# Patient Record
Sex: MALE | Race: WHITE
[De-identification: names, ages, dates, MRNs, and addresses within clinical notes are randomized per-mention and may not be internally consistent; named-entity substitution may affect disease eponyms.]

---

## 2019-08-13 ENCOUNTER — HOSPITAL ENCOUNTER (EMERGENCY)
Dept: HOSPITAL 84 - D.ER | Age: 69
Discharge: HOME | End: 2019-08-13
Payer: MEDICARE

## 2019-08-13 VITALS
HEIGHT: 64 IN | BODY MASS INDEX: 26.52 KG/M2 | WEIGHT: 155.33 LBS | HEIGHT: 64 IN | WEIGHT: 155.33 LBS | BODY MASS INDEX: 26.52 KG/M2

## 2019-08-13 VITALS — DIASTOLIC BLOOD PRESSURE: 74 MMHG | SYSTOLIC BLOOD PRESSURE: 125 MMHG

## 2019-08-13 DIAGNOSIS — F17.210: ICD-10-CM

## 2019-08-13 DIAGNOSIS — T67.5XXA: Primary | ICD-10-CM
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
## 2019-08-13 DIAGNOSIS — R44.3: ICD-10-CM

## 2019-08-13 DIAGNOSIS — I10: ICD-10-CM

## 2019-08-13 LAB
ALBUMIN SERPL-MCNC: 4.1 G/DL (ref 3.4–5)
ALP SERPL-CCNC: 74 U/L (ref 46–116)
ALT SERPL-CCNC: 19 U/L (ref 10–68)
AMPHETAMINES UR QL SCN: NEGATIVE QUAL
ANION GAP SERPL CALC-SCNC: 16.3 MMOL/L (ref 8–16)
APAP SERPL-MCNC: 0 UG/ML (ref 10–30)
APPEARANCE UR: CLEAR
BARBITURATES UR QL SCN: NEGATIVE QUAL
BASOPHILS NFR BLD AUTO: 0.1 % (ref 0–2)
BENZODIAZ UR QL SCN: NEGATIVE QUAL
BILIRUB SERPL-MCNC: 0.61 MG/DL (ref 0.2–1.3)
BILIRUB SERPL-MCNC: NEGATIVE MG/DL
BUN SERPL-MCNC: 21 MG/DL (ref 7–18)
BZE UR QL SCN: NEGATIVE QUAL
CALCIUM SERPL-MCNC: 9.7 MG/DL (ref 8.5–10.1)
CANNABINOIDS UR QL SCN: NEGATIVE QUAL
CHLORIDE SERPL-SCNC: 103 MMOL/L (ref 98–107)
CO2 SERPL-SCNC: 28.5 MMOL/L (ref 21–32)
COLOR UR: YELLOW
CREAT SERPL-MCNC: 1.4 MG/DL (ref 0.6–1.3)
EOSINOPHIL NFR BLD: 0.8 % (ref 0–7)
ERYTHROCYTE [DISTWIDTH] IN BLOOD BY AUTOMATED COUNT: 13 % (ref 11.5–14.5)
ETHANOL SERPL-MCNC: 0 MG/DL (ref 0–10)
GLOBULIN SER-MCNC: 3.9 G/L
GLUCOSE SERPL-MCNC: 108 MG/DL (ref 74–106)
GLUCOSE SERPL-MCNC: NEGATIVE MG/DL
HCT VFR BLD CALC: 46.5 % (ref 42–54)
HGB BLD-MCNC: 16.6 G/DL (ref 13.5–17.5)
IMM GRANULOCYTES NFR BLD: 0.3 % (ref 0–5)
KETONES UR STRIP-MCNC: NEGATIVE MG/DL
LYMPHOCYTES NFR BLD AUTO: 14 % (ref 15–50)
MAGNESIUM SERPL-MCNC: 2.3 MG/DL (ref 1.8–2.4)
MCH RBC QN AUTO: 34.2 PG (ref 26–34)
MCHC RBC AUTO-ENTMCNC: 35.7 G/DL (ref 31–37)
MCV RBC: 95.7 FL (ref 80–100)
MONOCYTES NFR BLD: 7.7 % (ref 2–11)
NEUTROPHILS NFR BLD AUTO: 77.1 % (ref 40–80)
NITRITE UR-MCNC: NEGATIVE MG/ML
OPIATES UR QL SCN: NEGATIVE QUAL
OSMOLALITY SERPL CALC.SUM OF ELEC: 290 MOSM/KG (ref 275–300)
PCP UR QL SCN: NEGATIVE QUAL
PH UR STRIP: 5 [PH] (ref 5–6)
PLATELET # BLD: 219 10X3/UL (ref 130–400)
PMV BLD AUTO: 10.2 FL (ref 7.4–10.4)
POTASSIUM SERPL-SCNC: 3.8 MMOL/L (ref 3.5–5.1)
PROT SERPL-MCNC: 8 G/DL (ref 6.4–8.2)
PROT UR-MCNC: NEGATIVE MG/DL
RBC # BLD AUTO: 4.86 10X6/UL (ref 4.2–6.1)
SODIUM SERPL-SCNC: 144 MMOL/L (ref 136–145)
SP GR UR STRIP: 1.02 (ref 1–1.02)
T4 SERPL-MCNC: 10.9 UG/DL (ref 4.7–13.3)
TSH SERPL-ACNC: 1.93 UIU/ML (ref 0.36–3.74)
UROBILINOGEN UR-MCNC: NORMAL MG/DL
WBC # BLD AUTO: 10.7 10X3/UL (ref 4.8–10.8)

## 2019-08-14 ENCOUNTER — HOSPITAL ENCOUNTER (INPATIENT)
Dept: HOSPITAL 84 - D.ER | Age: 69
LOS: 15 days | Discharge: HOME | DRG: 57 | End: 2019-08-29
Attending: PSYCHIATRY & NEUROLOGY | Admitting: PSYCHIATRY & NEUROLOGY
Payer: MEDICARE

## 2019-08-14 VITALS — DIASTOLIC BLOOD PRESSURE: 81 MMHG | SYSTOLIC BLOOD PRESSURE: 127 MMHG

## 2019-08-14 VITALS — BODY MASS INDEX: 29.5 KG/M2 | WEIGHT: 181.78 LBS | BODY MASS INDEX: 29.6 KG/M2

## 2019-08-14 VITALS — SYSTOLIC BLOOD PRESSURE: 150 MMHG | DIASTOLIC BLOOD PRESSURE: 79 MMHG

## 2019-08-14 VITALS — SYSTOLIC BLOOD PRESSURE: 140 MMHG | DIASTOLIC BLOOD PRESSURE: 70 MMHG

## 2019-08-14 DIAGNOSIS — F02.81: ICD-10-CM

## 2019-08-14 DIAGNOSIS — S01.111D: ICD-10-CM

## 2019-08-14 DIAGNOSIS — R44.2: ICD-10-CM

## 2019-08-14 DIAGNOSIS — I10: ICD-10-CM

## 2019-08-14 DIAGNOSIS — G30.9: Primary | ICD-10-CM

## 2019-08-14 DIAGNOSIS — F17.200: ICD-10-CM

## 2019-08-14 DIAGNOSIS — J44.9: ICD-10-CM
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
## 2019-08-15 VITALS — SYSTOLIC BLOOD PRESSURE: 139 MMHG | DIASTOLIC BLOOD PRESSURE: 82 MMHG

## 2019-08-15 VITALS — SYSTOLIC BLOOD PRESSURE: 131 MMHG | DIASTOLIC BLOOD PRESSURE: 66 MMHG

## 2019-08-15 LAB
ALBUMIN SERPL-MCNC: 3.4 G/DL (ref 3.4–5)
ALP SERPL-CCNC: 66 U/L (ref 46–116)
ALT SERPL-CCNC: 24 U/L (ref 10–68)
ANION GAP SERPL CALC-SCNC: 12.5 MMOL/L (ref 8–16)
BASOPHILS NFR BLD AUTO: 0.2 % (ref 0–2)
BILIRUB SERPL-MCNC: 0.81 MG/DL (ref 0.2–1.3)
BUN SERPL-MCNC: 15 MG/DL (ref 7–18)
CALCIUM SERPL-MCNC: 8.6 MG/DL (ref 8.5–10.1)
CHLORIDE SERPL-SCNC: 103 MMOL/L (ref 98–107)
CHOLEST/HDLC SERPL: 4.8 RATIO (ref 2.3–4.9)
CO2 SERPL-SCNC: 27.9 MMOL/L (ref 21–32)
CREAT SERPL-MCNC: 1 MG/DL (ref 0.6–1.3)
EOSINOPHIL NFR BLD: 4.4 % (ref 0–7)
ERYTHROCYTE [DISTWIDTH] IN BLOOD BY AUTOMATED COUNT: 13 % (ref 11.5–14.5)
EST. AVERAGE GLUCOSE BLD GHB EST-MCNC: 131 MG/DL (ref 74–154)
GLOBULIN SER-MCNC: 3.4 G/L
GLUCOSE SERPL-MCNC: 127 MG/DL (ref 74–106)
HCT VFR BLD CALC: 45.8 % (ref 42–54)
HDLC SERPL-MCNC: 30 MG/DL (ref 32–96)
HGB BLD-MCNC: 15.8 G/DL (ref 13.5–17.5)
IMM GRANULOCYTES NFR BLD: 0.1 % (ref 0–5)
LDL-HDL RATIO: 3.2 RATIO (ref 1.5–3.5)
LDLC SERPL-MCNC: 95 MG/DL (ref 0–100)
LYMPHOCYTES NFR BLD AUTO: 24.3 % (ref 15–50)
MCH RBC QN AUTO: 32.9 PG (ref 26–34)
MCHC RBC AUTO-ENTMCNC: 34.5 G/DL (ref 31–37)
MCV RBC: 95.4 FL (ref 80–100)
MONOCYTES NFR BLD: 7.1 % (ref 2–11)
NEUTROPHILS NFR BLD AUTO: 63.9 % (ref 40–80)
OSMOLALITY SERPL CALC.SUM OF ELEC: 281 MOSM/KG (ref 275–300)
PLATELET # BLD: 221 10X3/UL (ref 130–400)
PMV BLD AUTO: 10.8 FL (ref 7.4–10.4)
POTASSIUM SERPL-SCNC: 3.4 MMOL/L (ref 3.5–5.1)
PROT SERPL-MCNC: 6.8 G/DL (ref 6.4–8.2)
RBC # BLD AUTO: 4.8 10X6/UL (ref 4.2–6.1)
SODIUM SERPL-SCNC: 140 MMOL/L (ref 136–145)
TRIGL SERPL-MCNC: 101 MG/DL (ref 30–200)
WBC # BLD AUTO: 9.2 10X3/UL (ref 4.8–10.8)

## 2019-08-16 VITALS — SYSTOLIC BLOOD PRESSURE: 147 MMHG | DIASTOLIC BLOOD PRESSURE: 81 MMHG

## 2019-08-16 VITALS — SYSTOLIC BLOOD PRESSURE: 122 MMHG | DIASTOLIC BLOOD PRESSURE: 67 MMHG
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
## 2019-08-17 VITALS — DIASTOLIC BLOOD PRESSURE: 79 MMHG | SYSTOLIC BLOOD PRESSURE: 139 MMHG

## 2019-08-17 VITALS — DIASTOLIC BLOOD PRESSURE: 79 MMHG | SYSTOLIC BLOOD PRESSURE: 138 MMHG

## 2019-08-17 NOTE — PN
PATIENT:JSOE ORTIZ                              MEDICAL RECORD: [id number]
                                                         LOCATION:[facility]
                                                         ADMISSION DATE: 08/14/19
 
PROGRESS NOTE
 
 
DATE OF SERVICE:  08/16/2019
 
SUBJECTIVE:  The patient's case was discussed with staff.  He has no new
complaint.
 
OBJECTIVE:  The patient is settling into the unit well.  He has no evidence of
acute dangerousness other than the fact that he needs supervision.  He is
tolerating his medicines well.  I am going to start him on Aricept.
 
ASSESSMENT:  No change in diagnoses.
 
PLAN:  Information regarding his social situation is still limited.  It is my
opinion that he is not able to fully care for himself.  What is largely unknown
at this point is exactly the degree of his impairment and how much we can
rectify that along with what environment or setting would be the least
restrictive to meet his needs.
 
TRANSINT:[id number] Voice Confirmation ID: [id number] DOCUMENT ID: [id number]
 
 
 
 
                                           
                                           CAMERON RAINEY MD             
 
 
 
Electronically Signed by CAMERON RAINEY on 08/17/19 at 1225
 
 
 
 
 
 
 
 
 
 
 
 
 
 
 
 
CC:                                                             [id number]
DICTATION DATE: 08/16/19 1553     :     08/16/19 2207      ADM IN  
                                                                              
[facility]                                          
[address] [place] [address]

## 2019-08-18 VITALS — DIASTOLIC BLOOD PRESSURE: 77 MMHG | SYSTOLIC BLOOD PRESSURE: 137 MMHG

## 2019-08-18 VITALS — SYSTOLIC BLOOD PRESSURE: 123 MMHG | DIASTOLIC BLOOD PRESSURE: 71 MMHG

## 2019-08-18 NOTE — PN
PATIENT:JOSE ORTIZ                              MEDICAL RECORD: [id number]
                                                         LOCATION:[facility]
                                                         ADMISSION DATE: 08/14/19
 
PROGRESS NOTE
 
 
DATE OF SERVICE:  08/17/2019
 
SUBJECTIVE:  The patient's case was discussed with staff.  He has no new
complaint.
 
OBJECTIVE:  The patient has no thoughts of harming himself or others.  He is
tolerating his medicines well.
 
ASSESSMENT:  No change in diagnoses.
 
PLAN:  Supportive and educational interventions were made.  Long-term prognosis
is guarded.
 
TRANSINT:[id number] Voice Confirmation ID: [id number] DOCUMENT ID: [id number]
 
 
 
 
                                           
                                           CAMERON RAINEY MD             
 
 
 
Electronically Signed by CAMERON RAINEY on 08/18/19 at 1229
 
 
 
 
 
 
 
 
 
 
 
 
 
 
 
 
 
 
 
 
CC:                                                             [id number]
DICTATION DATE: 08/17/19 1228     :     08/17/19 1439      ADM IN  
                                                                              
[id number] [place] [address]

## 2019-08-19 VITALS — SYSTOLIC BLOOD PRESSURE: 121 MMHG | DIASTOLIC BLOOD PRESSURE: 76 MMHG

## 2019-08-19 VITALS — DIASTOLIC BLOOD PRESSURE: 64 MMHG | SYSTOLIC BLOOD PRESSURE: 130 MMHG

## 2019-08-19 NOTE — PN
PATIENT:JOSE ORTIZ                              MEDICAL RECORD: [id number]
                                                         LOCATION:[facility]
                                                         ADMISSION DATE: 08/14/19
 
PROGRESS NOTE
 
 
DATE OF SERVICE:  08/18/2019
 
SUBJECTIVE:  The patient's case was discussed with staff.  He has no new
complaint.
 
OBJECTIVE:  The patient is significantly impaired cognitively, but has almost no
insight about this.  He has not been aggressive or disruptive in any significant
way.  He has had no further hallucinatory experiences.
 
ASSESSMENT:  No change in diagnoses.
 
PLAN:  Current medicines have been reviewed and will be maintained.  Long-term
prognosis is guarded.
 
TRANSINT:[id number] Voice Confirmation ID: [id number] DOCUMENT ID: [id number]
 
 
 
 
                                           
                                           CAMERON RAINEY MD             
 
 
 
Electronically Signed by CAMERON RAINEY on 08/19/19 at 1428
 
 
 
 
 
 
 
 
 
 
 
 
 
 
 
 
 
 
 
CC:                                                             [id number]
DICTATION DATE: 08/18/19 1233     :     08/18/19 1503      ADM IN  
                                                                              
[facility]                                          
[address] [place] [address]

## 2019-08-20 VITALS — SYSTOLIC BLOOD PRESSURE: 140 MMHG | DIASTOLIC BLOOD PRESSURE: 80 MMHG

## 2019-08-20 VITALS — DIASTOLIC BLOOD PRESSURE: 70 MMHG | SYSTOLIC BLOOD PRESSURE: 132 MMHG

## 2019-08-21 VITALS — DIASTOLIC BLOOD PRESSURE: 92 MMHG | SYSTOLIC BLOOD PRESSURE: 135 MMHG

## 2019-08-21 VITALS — DIASTOLIC BLOOD PRESSURE: 65 MMHG | SYSTOLIC BLOOD PRESSURE: 110 MMHG

## 2019-08-21 NOTE — PN
PATIENT:JOSE ORTIZ                              MEDICAL RECORD: [id number]
                                                         LOCATION:[facility]
                                                         ADMISSION DATE: 08/14/19
 
PROGRESS NOTE
 
 
DATE OF SERVICE:  08/20/2019
 
SUBJECTIVE:  The patient's case was discussed with staff.  He has no new
complaint.
 
OBJECTIVE:  The patient is in good behavioral control with limited insight about
his condition.  He does tolerate his medicines well.
 
ASSESSMENT:  No change in diagnoses.
 
PLAN:  The patient has had no psychotic symptoms and no agitation.  He is
cognitively impaired, and at this point, it is unknown which environment or
setting would be the least restrictive for him.
 
TRANSINT:[id number] Voice Confirmation ID: [id number] DOCUMENT ID: [id number]
 
 
 
 
                                           
                                           CAMERON RAINEY MD             
 
 
 
Electronically Signed by CAMERON RAINEY on 08/21/19 at 1135
 
 
 
 
 
 
 
 
 
 
 
 
 
 
 
 
 
 
 
CC:                                                             [id number]
DICTATION DATE: 08/20/19 1557     :     08/20/19 1624      ADM IN  
                                                                              
[id number] [place] [address] [phone]

## 2019-08-22 VITALS — DIASTOLIC BLOOD PRESSURE: 68 MMHG | SYSTOLIC BLOOD PRESSURE: 128 MMHG

## 2019-08-22 VITALS — DIASTOLIC BLOOD PRESSURE: 70 MMHG | SYSTOLIC BLOOD PRESSURE: 116 MMHG

## 2019-08-22 NOTE — PN
PATIENT:JOSE ORTIZ                              MEDICAL RECORD: [id number]
                                                         LOCATION:[facility]
                                                         ADMISSION DATE: 08/14/19
 
PROGRESS NOTE
 
 
DATE OF SERVICE:  08/21/2019
 
SUBJECTIVE:  The patient's case was discussed with staff.  He has no new
complaint.
 
OBJECTIVE:  The patient is in good behavioral control, but impaired cognitively.
 He denies any thoughts of harming himself or others.  He is tolerating his
medicines well.
 
ASSESSMENT:  No change in diagnoses.
 
PLAN:  The patient is going to be given a low dose of trazodone to assist with
sleep consolidation.  His long-term prognosis is guarded and adult protective
services is scheduled to conduct their investigation and find out information
that is difficult for us to evaluate.  It appears the man has no home, no
family, no money and just an old pickup truck with a few belongings in it.  He
is most definitely in need of 24-hour-a-day supervision.  What is not clear is
what environment is required to provide that.
 
TRANSINT:[id number] Voice Confirmation ID: [id number] DOCUMENT ID: [id number]
 
 
 
 
                                           
                                           CAMERON RAINEY MD             
 
 
 
Electronically Signed by CAMERON RAINEY on 08/22/19 at 1008
 
 
 
 
 
 
 
 
 
 
 
 
 
 
CC:                                                             [id number]
DICTATION DATE: 08/21/19 1228     :     08/21/19 1237      ADM IN  
                                                                              
[address] [place] [address]

## 2019-08-23 VITALS — DIASTOLIC BLOOD PRESSURE: 75 MMHG | SYSTOLIC BLOOD PRESSURE: 132 MMHG

## 2019-08-23 VITALS — DIASTOLIC BLOOD PRESSURE: 66 MMHG | SYSTOLIC BLOOD PRESSURE: 127 MMHG

## 2019-08-24 VITALS — SYSTOLIC BLOOD PRESSURE: 119 MMHG | DIASTOLIC BLOOD PRESSURE: 64 MMHG

## 2019-08-24 VITALS — DIASTOLIC BLOOD PRESSURE: 70 MMHG | SYSTOLIC BLOOD PRESSURE: 122 MMHG

## 2019-08-24 NOTE — PN
PATIENT:JOSE ORTIZ                              MEDICAL RECORD: [id number]
                                                         LOCATION:[facility]
                                                         ADMISSION DATE: 08/14/19
 
PROGRESS NOTE
 
 
DATE OF SERVICE:  08/22/2019
 
SUBJECTIVE:  The patient's case was discussed with staff.  He has no new
complaint.
 
OBJECTIVE:  The patient is sleeping well and eating well.  He is impaired
cognitively, but has almost no insight about this.  At this point, there still
are no family members that we have been able to contact and no evidence that he
has any place to live other than his truck.
 
ASSESSMENT:  No change in diagnoses.
 
PLAN:  Current medicines will be maintained.  Long-term prognosis is guarded.
 
TRANSINT:[id number] Voice Confirmation ID: [id number] DOCUMENT ID: [id number]
 
 
 
 
                                           
                                           CAMERON RAINEY MD             
 
 
 
Electronically Signed by CAMERON RAINEY on 08/24/19 at 1239
 
 
 
 
 
 
 
 
 
 
 
 
 
 
 
 
 
 
 
CC:                                                             [id number]
DICTATION DATE: 08/22/19 1015     :     08/22/19 1021      ADM IN  
                                                                              
[facility]                                          
[address] [place] [address]

## 2019-08-24 NOTE — PN
PATIENT:JOSE ORTIZ                              MEDICAL RECORD: [id number]
                                                         LOCATION:[facility]
                                                         ADMISSION DATE: 08/14/19
 
PROGRESS NOTE
 
 
DATE OF SERVICE:  08/23/2019
 
SUBJECTIVE:  The patient's case was discussed with staff.  He has no new
complaint.
 
OBJECTIVE:  The patient is in good behavioral control.  Unfortunately, he has
been having some bizarre perceptual changes.  He denies that he would seek to
harm himself or others.  It appears that he has no close family or anyone who
would assist him or take care of him.
 
ASSESSMENT:  No change in diagnoses.
 
PLAN:  The patient will be maintained on current medicines.  I am, however,
going to add Celexa for its antidepressant effect.
 
TRANSINT:[id number] Voice Confirmation ID: [id number] DOCUMENT ID: [id number]
 
 
 
 
                                           
                                           CAMERON RAINEY MD             
 
 
 
Electronically Signed by CAMERON RAINEY on 08/24/19 at 1239
 
 
 
 
 
 
 
 
 
 
 
 
 
 
 
 
 
 
CC:                                                             [id number]
DICTATION DATE: 08/23/19 1804     :     08/23/19 1819      ADM IN  
                                                                              
[id number] [place] [address] [phone]

## 2019-08-25 VITALS — SYSTOLIC BLOOD PRESSURE: 158 MMHG | DIASTOLIC BLOOD PRESSURE: 92 MMHG

## 2019-08-25 VITALS — SYSTOLIC BLOOD PRESSURE: 146 MMHG | DIASTOLIC BLOOD PRESSURE: 74 MMHG

## 2019-08-25 NOTE — PN
PATIENT:JOSE ORTIZ                              MEDICAL RECORD: [id number]
                                                         LOCATION:[facility]
                                                         ADMISSION DATE: 08/14/19
 
PROGRESS NOTE
 
 
DATE OF SERVICE:  08/24/2019
 
SUBJECTIVE:  The patient's case was discussed with staff.  He has no new
complaint.
 
OBJECTIVE:  The patient is in good behavioral control and has near euthymic
mood.  Unfortunately, he has been observed twice in the past 2 days, talking to
someone not present.  When asked about this, he indicates he does not know what
I am talking about and looks genuinely perplexed.
 
ASSESSMENT:  No change in diagnoses.
 
PLAN:  The patient is going to be treated with a low dose of an antipsychotic
medication.  His long-term prognosis is guarded.
 
TRANSINT:[id number] Voice Confirmation ID: [id number] DOCUMENT ID: [id number]
 
 
 
 
                                           
                                           CAMERON RAINEY MD             
 
 
 
Electronically Signed by CAMERON RAINEY on 08/25/19 at 1114
 
 
 
 
 
 
 
 
 
 
 
 
 
 
 
 
 
 
CC:                                                             [id number]
DICTATION DATE: 08/24/19 1241     :     08/24/19 1251      ADM IN  
                                                                              
[id number] [place] [address] [phone]

## 2019-08-26 VITALS — DIASTOLIC BLOOD PRESSURE: 81 MMHG | SYSTOLIC BLOOD PRESSURE: 129 MMHG

## 2019-08-26 VITALS — DIASTOLIC BLOOD PRESSURE: 66 MMHG | SYSTOLIC BLOOD PRESSURE: 125 MMHG

## 2019-08-26 NOTE — PN
PATIENT:JOSE ORTIZ                              MEDICAL RECORD: [id number]
                                                         LOCATION:[facility]    [facility]
                                                         ADMISSION DATE: 08/14/19
 
PROGRESS NOTE
 
 
DATE OF SERVICE:  08/25/2019
 
SUBJECTIVE:  The patient's case was discussed with staff.  He has no new
complaint.
 
OBJECTIVE:  The patient is in good behavioral control with limited insight about
his condition.  He tolerates his medicines well.
 
ASSESSMENT:  No change in diagnoses.
 
PLAN:  Current medicines and therapies have been reviewed and will be
maintained.  The patient is in need of 24 hours a day supervision.  Long-term
prognosis is guarded.
 
TRANSINT:[id number] Voice Confirmation ID: [id number] DOCUMENT ID: [id number]
 
 
 
 
                                           
                                           CAMERON RAINEY MD             
 
 
 
Electronically Signed by CAMERON RAINEY on 08/26/19 at 1342
 
 
 
 
 
 
 
 
 
 
 
 
 
 
 
 
 
 
 
CC:                                                             [id number]
DICTATION DATE: 08/25/19 1116     :     08/25/19 1131      ADM IN  
                                                                              
[id number] [place] [address]

## 2019-08-27 VITALS — DIASTOLIC BLOOD PRESSURE: 67 MMHG | SYSTOLIC BLOOD PRESSURE: 136 MMHG

## 2019-08-27 VITALS — SYSTOLIC BLOOD PRESSURE: 147 MMHG | DIASTOLIC BLOOD PRESSURE: 88 MMHG

## 2019-08-27 NOTE — PN
PATIENT:JOSE ORTIZ                              MEDICAL RECORD: [id number]
                                                         LOCATION:[facility]
                                                         ADMISSION DATE: 08/14/19
 
PROGRESS NOTE
 
 
DATE OF SERVICE:  08/26/2019
 
SUBJECTIVE:  The patient's case was discussed with staff.  He has no new
complaint.
 
OBJECTIVE:  The patient is in good behavioral control with limited insight about
his condition.  He does tolerate his medicines well.
 
ASSESSMENT:  No change in diagnoses.
 
PLAN:  There are issues related to his discharge since he is from [place],
[place] adult protective services is not wanting to get involved in the case
and because he is in [place] and not currently in [facility] adult
protective [facility] does not want to get involved in the case.  This presents
something of a dilemma, which I will discuss with the treatment team tomorrow.
 
TRANSINT:[id number] Voice Confirmation ID: [id number] DOCUMENT ID: [id number]
 
 
 
 
                                           
                                           CAMERON RAINEY MD             
 
 
 
Electronically Signed by CAMERON RAINEY on 08/27/19 at 1220
 
 
 
 
 
 
 
 
 
 
 
 
 
 
 
 
 
CC:                                                             [id number]
DICTATION DATE: 08/26/19 1516     :     08/26/19 1606      ADM IN  
                                                                              
[id number] [phone]

## 2019-08-28 VITALS — DIASTOLIC BLOOD PRESSURE: 82 MMHG | SYSTOLIC BLOOD PRESSURE: 143 MMHG

## 2019-08-28 VITALS — DIASTOLIC BLOOD PRESSURE: 74 MMHG | SYSTOLIC BLOOD PRESSURE: 133 MMHG

## 2019-08-28 NOTE — PN
PATIENT:JOSE ORTIZ                              MEDICAL RECORD: [id number]
                                                         LOCATION:[facility]
                                                         ADMISSION DATE: 08/14/19
 
PROGRESS NOTE
 
 
DATE OF SERVICE:  08/27/2019
 
SUBJECTIVE:  The patient's case was discussed with staff.  He has no new
complaint.
 
OBJECTIVE:  The patient is in good behavioral control with limited insight about
his condition.  He does tolerate his medicines well.
 
ASSESSMENT:  No change in diagnoses.
 
PLAN:  The patient is still having some psychotic symptoms.  He has been
observed again talking to people not present.  When asked about this, he
dismisses it and minimizes it.  As I explained yesterday, Adult Protective
Services and both states are not willing to intervene at this point.  There are
some friends who live in [place] who are not willing to take responsibility
for him, but are helping the  locate a nursing home in the area and
are going to assist with getting him placed there.  His long-term prognosis is
guarded.  I am going to maintain him on the antipsychotic medicine.
 
TRANSINT:[id number] Voice Confirmation ID: [id number] DOCUMENT ID: [id number]
 
 
 
 
                                           
                                           CAMERON RAINEY MD             
 
 
 
Electronically Signed by CAMERON RAINEY on 08/28/19 at 0956
 
 
 
 
 
 
 
 
 
 
 
 
 
 
CC:                                                             [id number]
DICTATION DATE: 08/27/19 1223     :     08/27/19 1241      ADM IN  
                                                                              
[id number] [place] [address]

## 2019-08-29 VITALS — SYSTOLIC BLOOD PRESSURE: 123 MMHG | DIASTOLIC BLOOD PRESSURE: 76 MMHG

## 2019-08-29 NOTE — PN
PATIENT:JOSE ORTIZ                              MEDICAL RECORD: [id number]
                                                         LOCATION:[facility]
                                                         ADMISSION DATE: 08/14/19
 
PROGRESS NOTE
 
 
DATE OF SERVICE:  08/28/2019
 
SUBJECTIVE:  The patient's case was discussed with staff.  He has no new
complaint.
 
OBJECTIVE:  The patient is in good behavioral control, but impaired cognitively.
 He has been observed talking to people not present again, and when asked about
this, he is dismissive and says he was not doing it, was just talking to
himself.  The observation is different from that and its quality and it is
clearly being described as active hallucination.  The patient has been evaluated
by adult protective services and their assessment does not agree with mine. 
They assessed him to not be impaired and they are not going to take custody of
him.  They are, however, going to assist us with the situation and drive him
back to his home in [place] and take him to his apartment.  Our ,
who is quite conscientious, has alerted the neighbor who is going to be there to
meet them and make sure that there is food and that the utilities are on in the
apartment.
 
ASSESSMENT:  No change in diagnoses.
 
PLAN:  The patient will be transitioned out of the hospital tomorrow and
returned to his home in [place].  I think the amount of supervision he is
going to receive there is inadequate.  He has already told me that he plans to
save his money by a truck and then plans to go to [place] to continue with
his [employer] purposes.  The authorities or at least adult protective services
in [place] are aware of this case.  I do not know if they will monitor him or
not.  He will have followup with his primary care physician there in [place].
 
TRANSINT:[id number] Voice Confirmation ID: [id number] DOCUMENT ID: [id number]
 
 
 
 
                                           
                                           CAMERON RAINEY MD             
 
 
 
Electronically Signed by CAMERON RAINEY on 08/29/19 at 1502
 
 
 
 
 
CC:                                                             [id number]
DICTATION DATE: 08/28/19 1518     :     08/28/19 1533      DIS IN  
                                                                      08/29/19
[facility]                                          
[address] [place] [address]

## 2019-08-30 NOTE — PN
PATIENT:JOSE ORTIZ                              MEDICAL RECORD: [id number]
                                                         LOCATION:[facility]
                                                         ADMISSION DATE: 08/14/19
 
PROGRESS NOTE
 
 
DATE OF SERVICE:  08/29/2019
 
SUBJECTIVE:  The patient's case was discussed with staff.  He has no new
complaint.
 
OBJECTIVE:  The patient is in good behavioral control.  He has a euthymic mood
and is very happy about being discharged and going home.
 
ASSESSMENT:  No change in diagnoses.
 
PLAN:  The patient will be discharged to adult protective services.  They are
going to drive him back to [place].  Followup will be with his primary care
physician in [place].  The patient says he is going to save his money, buy a
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
call to go in preach.  I think his prognosis is guarded.
 
TRANSINT:[id number] Voice Confirmation ID: [id number] DOCUMENT ID: [id number]
 
 
 
 
                                           
                                           CAMERON RAINEY MD             
 
 
 
Electronically Signed by CAMERON RAINEY on 08/30/19 at 1606
 
 
 
 
 
 
 
 
 
 
 
 
 
 
 
 
 
CC:                                                             [id number]
DICTATION DATE: 08/29/19 1515     :     08/29/19 1648      DIS IN  
                                                                      08/29/19
[id number] [place] [address]

## 2019-09-03 NOTE — DS
PATIENT:JOSE ORTIZ                      :52   MEDICAL RECORD: [id number]
 
                              DISCHARGE SUMMARY
                                                         
ADMISSION DATE:    19                       DISCHARGE DATE:     19
 
 
IDENTIFYING DATA:  The patient is 69 years old and he was admitted to the
hospital on a voluntary basis secondary to confusion and psychotic symptoms. 
The patient apparently had been stranded in [place] and was living in his
truck for a few days prior to admission.  He has been acting bizarre and
paranoid.  He was brought to the Emergency Room by the authorities and this was
after he fell and cut his eye.  He was hyper-Presybeterian and delusional and said
that he was literally not figuratively, but literally in a fight with the devil
and it was the devil who would hit him and cut his eye.
 
HOSPITAL COURSE:  The patient was admitted to the hospital and fully evaluated
from both a medical, psychological, and social standpoint.  He was clearly
suffering from dementia and the psychotic symptoms he was having were associated
with the dementia.  He was treated with memory enhancing antipsychotic and mood
stabilizing medications and showed dramatic improvement all of his symptoms
including the cognitive deficits.  It was clear that he was homeless or at least
it appeared clear and Adult Protective Services was called.  Through their
efforts, it was discovered that he did have an apartment in [place] and adult
protective services in the [place] did not want to take over the case because
he was physically in [place].  The authorities in [place] examined him and
disagreed with my diagnosis of dementia or at least felt that it was not nearly
as severe as I think it is and so they would not take custody; however, they
were kind enough to take him back to his apartment.  It is hoped that the
authorities in [place] will at least to do a wellness check.  The patient has
a delusion that is fixed that there is a [employer] in [place] and he
needs to get there to help them spread the Gospel.  He tells me that when he
goes home, he is going to save his money, buy another truck, and then head to
[place].
 
Treatment is being arranged by adult protective services in [place].
 
DISCHARGE DIAGNOSES:
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
features.
AXIS II:  None.
AXIS III:  Hypertension, chronic obstructive pulmonary disease, and gout.
AXIS IV:  Moderate.
AXIS V:  Global assessment of functioning is 45.
 
PLAN:  At the time of discharge, the patient was in good behavioral control and
had no active thoughts of harming himself or others.  He was tolerating his
medications well.  His long-term prognosis is guarded.  Followup has hopefully
been arranged and his outcome will be largely contingent upon outpatient
followup and the authorities in [place] are checking on him to make sure that
he is being properly taken care of.
 
TRANSINT:[id number] Voice Confirmation ID: [id number] DOCUMENT ID: [id number]
 
 
 
DISCHARGE SUMMARY REPORT                       [id number]    JOSE ORTIZ, CAMERON BENZ             
 
 
 
Electronically Signed by CAMERON RAINEY on 19 at 1520
 
 
 
 
 
 
 
 
 
 
 
 
 
 
 
 
 
 
 
 
 
 
 
 
 
 
 
 
 
 
 
 
 
 
 
 
 
 
 
 
 
CC:                                                             [id number]
DICTATION DATE: 19 1152     :     19      DIS IN  
                                                                      19
[facility]                                          
[address] [place] [address]